# Patient Record
Sex: FEMALE | Race: BLACK OR AFRICAN AMERICAN | NOT HISPANIC OR LATINO
[De-identification: names, ages, dates, MRNs, and addresses within clinical notes are randomized per-mention and may not be internally consistent; named-entity substitution may affect disease eponyms.]

---

## 2017-07-22 ENCOUNTER — HEALTH MAINTENANCE LETTER (OUTPATIENT)
Age: 37
End: 2017-07-22

## 2017-07-31 ENCOUNTER — OFFICE VISIT - HEALTHEAST (OUTPATIENT)
Dept: INTERNAL MEDICINE | Facility: CLINIC | Age: 37
End: 2017-07-31

## 2017-07-31 DIAGNOSIS — M25.561 BILATERAL KNEE PAIN: ICD-10-CM

## 2017-07-31 DIAGNOSIS — F41.1 GENERALIZED ANXIETY DISORDER: ICD-10-CM

## 2017-07-31 DIAGNOSIS — H91.91 HEARING LOSS, RIGHT: ICD-10-CM

## 2017-07-31 DIAGNOSIS — J45.20 MILD INTERMITTENT ASTHMA WITHOUT COMPLICATION: ICD-10-CM

## 2017-07-31 DIAGNOSIS — Z72.0 TOBACCO ABUSE: ICD-10-CM

## 2017-07-31 DIAGNOSIS — M25.562 BILATERAL KNEE PAIN: ICD-10-CM

## 2017-07-31 ASSESSMENT — MIFFLIN-ST. JEOR: SCORE: 1828.01

## 2019-01-07 ENCOUNTER — COMMUNICATION - HEALTHEAST (OUTPATIENT)
Dept: INTERNAL MEDICINE | Facility: CLINIC | Age: 39
End: 2019-01-07

## 2019-01-08 ENCOUNTER — AMBULATORY - HEALTHEAST (OUTPATIENT)
Dept: INTERNAL MEDICINE | Facility: CLINIC | Age: 39
End: 2019-01-08

## 2021-05-29 ENCOUNTER — HEALTH MAINTENANCE LETTER (OUTPATIENT)
Age: 41
End: 2021-05-29

## 2021-05-31 VITALS — WEIGHT: 247 LBS | HEIGHT: 67 IN | BODY MASS INDEX: 38.77 KG/M2

## 2021-06-02 ENCOUNTER — RECORDS - HEALTHEAST (OUTPATIENT)
Dept: ADMINISTRATIVE | Facility: CLINIC | Age: 41
End: 2021-06-02

## 2021-06-03 ENCOUNTER — RECORDS - HEALTHEAST (OUTPATIENT)
Dept: ADMINISTRATIVE | Facility: CLINIC | Age: 41
End: 2021-06-03

## 2021-06-12 NOTE — PROGRESS NOTES
"  Office Visit - Follow Up   Jerrod Neil   36 y.o. female    Date of Visit: 7/31/2017    Chief Complaint   Patient presents with     emergency room follow up        Assessment and Plan   1. Generalized anxiety disorder  We discussed treatment options including restarting an SSRI.  She is concerned about possible side effects that she did have some trouble with what sounds like Zoloft.  Will hold off for now but this can be further addressed at her upcoming physical.  Also discussed referral to psychologist but she would like to think about that.    2. Bilateral knee pain  I suspect related to weight and likely has chondromalacia patella.    3. Hearing loss, right  Long history of hearing loss right ear with hearing aid    4. Mild intermittent asthma without complication  Right-sided chest pain yesterday quickly resolved with albuterol neb.  She describes a \"crackling\" sensation in her chest.  Now has albuterol inhaler at home.  Will get her peak flow meter.  Obtain spirometry.  Consider maintenance corticosteroid inhaler if symptoms are more persistent.  She does describe feeling dyspneic quite often.  - Peak Flow Meter  - Spirometry without bronchodilator    5. Tobacco abuse  Discussed the importance of smoking cessation.  We will get her some more Nicotrol cartridges that she can use in place of cigarettes.    Return in about 4 weeks (around 8/28/2017) for Annual physical.     History of Present Illness   This 36 y.o. old woman who is new to our office here to establish care, to discuss several medical problems, and to follow-up after ER evaluation yesterday.  She has a history of intermittent asthma.  Normally does not use inhalers.  Unfortunately also smokes up to 7 cigarettes daily.  She presented the emergency room with right-sided chest pain and dyspnea.  Was given an albuterol neb and her symptoms quickly resolved.  Also found to have elevated blood pressure but has not been on her blood pressure " "medication for the past several years.  She was given an albuterol inhaler and was told to get a peak flow meter.  She does describe some chronic dyspnea with exertion.  This seems to be getting worse.  She is try to quit smoking on several occasions.  She has had some luck using a Nicotrol cartridge.  We also discussed problems she has with generalized anxiety.  She has been prescribed Zoloft and Celexa in the past but recalls not taking either for very long.  She recalls having some side effects.     Review of Systems:  Otherwise, a comprehensive review of systems was negative except as noted.     Medications, Allergies and Problem List   Patient Active Problem List   Diagnosis     Generalized anxiety disorder     Bilateral knee pain     Hearing loss     Mild intermittent asthma     Tobacco abuse     Bilateral low back pain without sciatica       She has no past surgical history on file.    No Known Allergies    Current Outpatient Prescriptions   Medication Sig Dispense Refill     albuterol (PROAIR HFA;PROVENTIL HFA;VENTOLIN HFA) 90 mcg/actuation inhaler Inhale.       nicotine (NICOTROL) 10 mg inhaler Inhale 1 puff as needed for smoking cessation. 168 Cartridge 1     triamterene-hydrochlorothiazide (DYAZIDE) 37.5-25 mg per capsule Take 1 capsule by mouth daily. 30 capsule 11     No current facility-administered medications for this visit.         Physical Exam   General Appearance:   Overweight but otherwise well-appearing young woman    BP (!) 132/94 (Patient Site: Left Arm, Patient Position: Sitting, Cuff Size: Adult Large)  Pulse 86  Ht 5' 7\" (1.702 m)  Wt (!) 247 lb (112 kg)  SpO2 97%  BMI 38.69 kg/m2    HEENT: Normal   Neck without lymphadenopathy or masses  Respiratory: Normal respiratory effort.  Lungs are clear with no rales or wheezes.  Heart: Regular rate and rhythm without murmurs, rubs, or gallops.    Abdomen: Abdomen is soft, nontender without guarding, rebound, masses, or " hepatosplenomegaly.  Extremities: No peripheral edema.  Neurologic: Grossly nonfocal  Skin: No cyanosis or pallor  Psych: Alert and oriented ×3, mood appropriate         Additional Information   Social History   Substance Use Topics     Smoking status: Current Every Day Smoker     Types: Cigarettes     Smokeless tobacco: Never Used     Alcohol use Yes      Comment: occassional         Review and/or order of clinical lab tests: Reviewed previous labs including hemoglobin of 7.0 and 2014 secondary to acute blood loss anemia from postpartum hemorrhage  Review and/or order of radiology tests: Reviewed chest x-ray from July 30, 2017 with no abnormalities      Review and summarization of old records and/or obtaining history from someone other than the patient and.or discussion of case with another health care provider: Reviewed outside records including ER evaluation on July 30, 2017 when presenting with right-sided chest pain      total time spent with the patient was 45 minutes of which >50% was spent in counseling and coordination of care     Tommy Ojeda MD

## 2021-06-22 NOTE — TELEPHONE ENCOUNTER
Dr. Ojeda,  Patient is wanting something else to help with congestion, possibly a nebulizer.  Since the patient has asthma, her insurance should pay for a nebulizer if you think that would be appropriate.    Please advise.  Thank you.  Mary LAZNA CMA/KAVON....................7:55 AM

## 2021-09-18 ENCOUNTER — HEALTH MAINTENANCE LETTER (OUTPATIENT)
Age: 41
End: 2021-09-18

## 2022-06-25 ENCOUNTER — HEALTH MAINTENANCE LETTER (OUTPATIENT)
Age: 42
End: 2022-06-25

## 2022-11-20 ENCOUNTER — HEALTH MAINTENANCE LETTER (OUTPATIENT)
Age: 42
End: 2022-11-20

## 2023-07-02 ENCOUNTER — HEALTH MAINTENANCE LETTER (OUTPATIENT)
Age: 43
End: 2023-07-02